# Patient Record
Sex: MALE | Race: WHITE | NOT HISPANIC OR LATINO | Employment: PART TIME | URBAN - METROPOLITAN AREA
[De-identification: names, ages, dates, MRNs, and addresses within clinical notes are randomized per-mention and may not be internally consistent; named-entity substitution may affect disease eponyms.]

---

## 2024-04-01 ENCOUNTER — OFFICE VISIT (OUTPATIENT)
Dept: FAMILY MEDICINE | Facility: OTHER | Age: 59
End: 2024-04-01
Payer: COMMERCIAL

## 2024-04-01 VITALS
DIASTOLIC BLOOD PRESSURE: 72 MMHG | TEMPERATURE: 97.6 F | OXYGEN SATURATION: 98 % | SYSTOLIC BLOOD PRESSURE: 126 MMHG | WEIGHT: 197 LBS | RESPIRATION RATE: 16 BRPM | HEART RATE: 71 BPM | HEIGHT: 66 IN | BODY MASS INDEX: 31.66 KG/M2

## 2024-04-01 DIAGNOSIS — Z80.42 FAMILY HISTORY OF PROSTATE CANCER: ICD-10-CM

## 2024-04-01 DIAGNOSIS — K42.9 UMBILICAL HERNIA WITHOUT OBSTRUCTION AND WITHOUT GANGRENE: ICD-10-CM

## 2024-04-01 DIAGNOSIS — E66.9 OBESITY (BMI 30-39.9): ICD-10-CM

## 2024-04-01 DIAGNOSIS — Z11.4 SCREENING FOR HIV (HUMAN IMMUNODEFICIENCY VIRUS): ICD-10-CM

## 2024-04-01 DIAGNOSIS — K21.00 GASTROESOPHAGEAL REFLUX DISEASE WITH ESOPHAGITIS WITHOUT HEMORRHAGE: ICD-10-CM

## 2024-04-01 DIAGNOSIS — Z00.00 ROUTINE GENERAL MEDICAL EXAMINATION AT A HEALTH CARE FACILITY: Primary | ICD-10-CM

## 2024-04-01 DIAGNOSIS — Z12.11 SCREEN FOR COLON CANCER: ICD-10-CM

## 2024-04-01 DIAGNOSIS — Z11.59 NEED FOR HEPATITIS C SCREENING TEST: ICD-10-CM

## 2024-04-01 DIAGNOSIS — Z12.5 SCREENING FOR PROSTATE CANCER: ICD-10-CM

## 2024-04-01 PROCEDURE — 99386 PREV VISIT NEW AGE 40-64: CPT | Performed by: STUDENT IN AN ORGANIZED HEALTH CARE EDUCATION/TRAINING PROGRAM

## 2024-04-01 PROCEDURE — 99213 OFFICE O/P EST LOW 20 MIN: CPT | Mod: 25 | Performed by: STUDENT IN AN ORGANIZED HEALTH CARE EDUCATION/TRAINING PROGRAM

## 2024-04-01 ASSESSMENT — PAIN SCALES - GENERAL: PAINLEVEL: NO PAIN (0)

## 2024-04-01 NOTE — PATIENT INSTRUCTIONS
Preventive Care Advice   This is general advice given by our system to help you stay healthy. However, your care team may have specific advice just for you. Please talk to your care team about your preventive care needs.  Nutrition  Eat 5 or more servings of fruits and vegetables each day.  Try wheat bread, brown rice and whole grain pasta (instead of white bread, rice, and pasta).  Get enough calcium and vitamin D. Check the label on foods and aim for 100% of the RDA (recommended daily allowance).  Lifestyle  Exercise at least 150 minutes each week   (30 minutes a day, 5 days a week).  Do muscle strengthening activities 2 days a week. These help control your weight and prevent disease.  No smoking.  Wear sunscreen to prevent skin cancer.  Have a dental exam and cleaning every 6 months.  Yearly exams  See your health care team every year to talk about:  Any changes in your health.  Any medicines your care team has prescribed.  Preventive care, family planning, and ways to prevent chronic diseases.  Shots (vaccines)   HPV shots (up to age 26), if you've never had them before.  Hepatitis B shots (up to age 59), if you've never had them before.  COVID-19 shot: Get this shot when it's due.  Flu shot: Get a flu shot every year.  Tetanus shot: Get a tetanus shot every 10 years.  Pneumococcal, hepatitis A, and RSV shots: Ask your care team if you need these based on your risk.  Shingles shot (for age 50 and up).  General health tests  Diabetes screening:  Starting at age 35, Get screened for diabetes at least every 3 years.  If you are younger than age 35, ask your care team if you should be screened for diabetes.  Cholesterol test: At age 39, start having a cholesterol test every 5 years, or more often if advised.  Bone density scan (DEXA): At age 50, ask your care team if you should have this scan for osteoporosis (brittle bones).  Hepatitis C: Get tested at least once in your life.  STIs (sexually transmitted  infections)  Before age 24: Ask your care team if you should be screened for STIs.  After age 24: Get screened for STIs if you're at risk. You are at risk for STIs (including HIV) if:  You are sexually active with more than one person.  You don't use condoms every time.  You or a partner was diagnosed with a sexually transmitted infection.  If you are at risk for HIV, ask about PrEP medicine to prevent HIV.  Get tested for HIV at least once in your life, whether you are at risk for HIV or not.  Cancer screening tests  Cervical cancer screening: If you have a cervix, begin getting regular cervical cancer screening tests at age 21. Most people who have regular screenings with normal results can stop after age 65. Talk about this with your provider.  Breast cancer scan (mammogram): If you've ever had breasts, begin having regular mammograms starting at age 40. This is a scan to check for breast cancer.  Colon cancer screening: It is important to start screening for colon cancer at age 45.  Have a colonoscopy test every 10 years (or more often if you're at risk) Or, ask your provider about stool tests like a FIT test every year or Cologuard test every 3 years.  To learn more about your testing options, visit: https://www.[employer]/297353.pdf.  For help making a decision, visit: https://bit.ly/yt79903.  Prostate cancer screening test: If you have a prostate and are age 55 to 69, ask your provider if you would benefit from a yearly prostate cancer screening test.  Lung cancer screening: If you are a current or former smoker age 50 to 80, ask your care team if ongoing lung cancer screenings are right for you.  For informational purposes only. Not to replace the advice of your health care provider. Copyright   2023 [employer]. All rights reserved. Clinically reviewed by the [facility]. [employer] 700041 - REV 01/24.            Lifestyle changes to improve acid reflux also known as  GERD (gastroesophageal reflux disease)    Review the following lifestyle changes that are known to help improve GERD symptoms. Select those you want to do to improve the quality of your life.  Eat smaller meals more often. Do not eat large meals or overeat, especially before exercising.  Stop eating two to four hours before going to bed.  Eat slowly and chew food well.  Stand or sit upright for at least 30 minutes after eating.  Raise the head of your bed 4 to 6 inches. To do this, put blocks or books under the legs at the head of your bed. Or place a wedge under the mattress. Do not sleep on several pillows as this can increase pressure on your stomach and make GERD worse.     -When you go to bed, start by lying on your left side to help make it less likely that you will have reflux.  -If you smoke, stop. Do not be around tobacco smoke.  -Lose weight if you are overweight. Excess weight around your waist causes more pressure on the stomach. Maintain a healthy weight.  -Limit how much alcohol you drink.  -Do not wear tight-fitting clothes.    Limit or stop eating foods that bring on your GERD symptoms or make your symptoms worse. Some foods that can cause or worsen GERD symptoms are listed below. You do not need to limit or stop all of the foods listed, only those that bring on or worsen your GERD symptoms.    Fatty foods, including cream sauces, butter, margarine, shortening  Fatty meat including high-fat hamburgers, vale, sausage and ribs  Chocolate, especially high-fat milk chocolate  Spearmint, peppermint  Tomatoes and tomato-based products (ketchup, pasta sauce, etc...)  Citrus fruit and juice  Caffeine  Carbonated drinks, especially soda pop with caffeine  Fried foods such as French fries and onion rings  High-fat dairy products including whole milk  Peanut butter and high-fat nuts  Hot sauces and peppers  Garlic  Onion  Apples  Cucumbers and pickles  Green peppers  Spicy food        2017 [facility] [employer] ([employer]). All rights reserved.  [id number]. Reviewed: 2023    Reviewed: 2023

## 2024-04-01 NOTE — PROGRESS NOTES
"  Assessment & Plan     Routine general medical examination at a health care facility  - Lipid panel reflex to direct LDL Fasting; Future  Screen for colon cancer  - Colonoscopy Screening  Referral; Future  Need for hepatitis C screening test  - Hepatitis C Screen Reflex to HCV RNA Quant and Genotype; Future  Health maintenance reviewed and updated.  Due for colonoscopy, previous tubular adenoma found in 2019 for 5-year follow-up.    Umbilical hernia without obstruction and without gangrene  Has been present for a few years, wishes to discuss things over with his surgeon to consider options.  Worrisome signs and symptoms discussed  - Adult General Surg Referral; Future    Obesity (BMI 30-39.9)  Adding to overall complexity, will A1c last year  - Glucose; Future    Screening for prostate cancer  - PSA, screen; Future  Family history of prostate cancer  Father with family history of prostate cancer.  Normal PSA last year.    Gastroesophageal reflux disease with esophagitis without hemorrhage  Continue with over-the-counter famotidine, also to consider dietary modification as well as lifestyle changes to improve symptoms.  Details outlined on the AVS      BMI  Estimated body mass index is 32.04 kg/m  as calculated from the following:    Height as of this encounter: 1.67 m (5' 5.75\").    Weight as of this encounter: 89.4 kg (197 lb).   Weight management plan: Discussed healthy diet and exercise guidelines          Jessica De Jesus is a 58 year old, presenting for the following health issues:  Hernia        4/1/2024     8:05 AM   Additional Questions   Roomed by kely   Accompanied by shonda         4/1/2024     8:05 AM   Patient Reported Additional Medications   Patient reports taking the following new medications none     History of Present Illness       Reason for visit:  Potential Hernia  Symptom onset:  More than a month  Symptoms include:  Bulge at belly button  Symptom intensity:  Mild  Symptom " "progression:  Staying the same  What makes it worse:  It doesn't hurt  What makes it better:  It doesn't hurt    He eats 0-1 servings of fruits and vegetables daily.He consumes 2 sweetened beverage(s) daily.He exercises with enough effort to increase his heart rate 9 or less minutes per day.  He exercises with enough effort to increase his heart rate 3 or less days per week.   He is taking medications regularly.    Notice it a few years ago wife wanted him to come in to get it checked out          Review of Systems  Constitutional, HEENT, cardiovascular, pulmonary, gi and gu systems are negative, except as otherwise noted.      Objective    /72   Pulse 71   Temp 97.6  F (36.4  C) (Temporal)   Resp 16   Ht 1.67 m (5' 5.75\")   Wt 89.4 kg (197 lb)   SpO2 98%   BMI 32.04 kg/m    Body mass index is 32.04 kg/m .  Physical Exam  Vitals and nursing note reviewed.   Constitutional:       General: He is not in acute distress.     Appearance: Normal appearance. He is not ill-appearing, toxic-appearing or diaphoretic.   HENT:      Head: Normocephalic and atraumatic.      Right Ear: Tympanic membrane, ear canal and external ear normal. There is no impacted cerumen.      Left Ear: Tympanic membrane, ear canal and external ear normal. There is no impacted cerumen.      Nose: Nose normal. No congestion or rhinorrhea.      Mouth/Throat:      Mouth: Mucous membranes are moist.      Pharynx: Oropharynx is clear. No oropharyngeal exudate or posterior oropharyngeal erythema.   Eyes:      General: No scleral icterus.        Right eye: No discharge.         Left eye: No discharge.      Extraocular Movements: Extraocular movements intact.      Conjunctiva/sclera: Conjunctivae normal.      Pupils: Pupils are equal, round, and reactive to light.   Cardiovascular:      Rate and Rhythm: Normal rate and regular rhythm.      Heart sounds: No murmur heard.  Pulmonary:      Effort: Pulmonary effort is normal. No respiratory distress. "      Breath sounds: Normal breath sounds. No stridor.   Abdominal:      General: There is no distension.      Palpations: Abdomen is soft.      Tenderness: There is no abdominal tenderness.      Hernia: No hernia is present.   Musculoskeletal:         General: Normal range of motion.      Cervical back: Normal range of motion.      Right lower leg: No edema.      Left lower leg: No edema.   Lymphadenopathy:      Cervical: No cervical adenopathy.   Skin:     General: Skin is warm.   Neurological:      Mental Status: He is alert.   Psychiatric:         Mood and Affect: Mood normal.         Behavior: Behavior normal.         Thought Content: Thought content normal.         Judgment: Judgment normal.                Signed Electronically by: NERI PERRIN MD

## 2024-04-08 ENCOUNTER — OFFICE VISIT (OUTPATIENT)
Dept: AUDIOLOGY | Facility: OTHER | Age: 59
End: 2024-04-08
Payer: COMMERCIAL

## 2024-04-08 DIAGNOSIS — H90.42 SENSORINEURAL HEARING LOSS (SNHL) OF LEFT EAR WITH UNRESTRICTED HEARING OF RIGHT EAR: Primary | ICD-10-CM

## 2024-04-08 PROCEDURE — 92550 TYMPANOMETRY & REFLEX THRESH: CPT | Performed by: AUDIOLOGIST

## 2024-04-08 PROCEDURE — 92557 COMPREHENSIVE HEARING TEST: CPT | Performed by: AUDIOLOGIST

## 2024-04-08 NOTE — PROGRESS NOTES
AUDIOLOGY REPORT    SUBJECTIVE:  Сергей Navarro is a 58 year old male who was seen in the Audiology Clinic at the [facility] for audiologic evaluation, referred by self. The patient has not noted major changes in hearing. 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 The patient reports a family history of hearing loss with age. He reports that he does not have tinnitus, dizziness, ear pressure, ear pain, or a history of ear problems or ear surgery. The patient was unaccompanied to today's appointment.    OBJECTIVE:  Otoscopic exam indicates ears are clear of cerumen bilaterally     Pure Tone Thresholds assessed using conventional audiometry with good  reliability from 250-8000 Hz bilaterally using insert earphones and circumaural headphones     RIGHT:  normal hearing sensitivity at all tested frequencies     LEFT:    normal hearing sensitivity through 4000 Hz sloping to  mild to moderate likely  sensorineural hearing loss    Tympanogram:    RIGHT: normal eardrum mobility    LEFT:   normal eardrum mobility    Reflexes (reported by stimulus ear):  RIGHT: Ipsilateral is present at normal levels  RIGHT: Contralateral is present at elevated levels   LEFT:   Ipsilateral is present at normal levels  LEFT:   Contralateral is present at elevated levels     Speech Reception Threshold:    RIGHT: 10 dB HL    LEFT:   10 dB HL    Word Recognition Score:     RIGHT: 100% at 55 dB HL using NU-6 recorded word list.    LEFT:   100% at 55 dB HL using NU-6 recorded word list.      ASSESSMENT:     ICD-10-CM    1. Sensorineural hearing loss (SNHL) of left ear with unrestricted hearing of right ear  H90.42 [facility] Recog ([id number])     Tymps / Reflex   ([id number])          Today s results were discussed with the patient in detail.     PLAN:  Patient was counseled regarding  hearing loss and impact on communication. Patient is note a candidate for amplification at this time. It is recommended that the patient return in a year to recheck hearing and assess the stability of the high frequency asymmetry. The patient was counseled that he could also follow up with ENT.  Please call this clinic with questions regarding these results or recommendations.      Codi Gomez, CCC-A  [place] Licensed Audiologist #[id number]  4/8/2024

## 2024-04-16 ENCOUNTER — TELEPHONE (OUTPATIENT)
Dept: GASTROENTEROLOGY | Facility: CLINIC | Age: 59
End: 2024-04-16
Payer: COMMERCIAL

## 2024-04-16 NOTE — TELEPHONE ENCOUNTER
"Endoscopy Scheduling Screen    Have you had a positive Covid test in the last 14 days?  No    What is your communication preference for Instructions and/or Bowel Prep?   MyChart    What insurance is in the chart?  Other:  [employer]    Ordering/Referring Provider:     NERI PERRIN      (If ordering provider performs procedure, schedule with ordering provider unless otherwise instructed. )    BMI: Estimated body mass index is 32.04 kg/m  as calculated from the following:    Height as of 4/1/24: 1.67 m (5' 5.75\").    Weight as of 4/1/24: 89.4 kg (197 lb).     Sedation Ordered  moderate sedation.   If patient BMI > 50 do not schedule in [facility].    If patient BMI > 45 do not schedule at [facility].    Are you taking methadone or Suboxone?  No    Have you had difficulties, pain, or discomfort during past endoscopy procedures?  No    Are you taking any prescription medications for pain 3 or more times per week?   NO, No RN review required.    Do you have a history of malignant hyperthermia?  No    (Females) Are you currently pregnant?   No     Have you been diagnosed or told you have pulmonary hypertension?   No    Do you have an LVAD?  No    Have you been told you have moderate to severe sleep apnea?  No    Have you been told you have COPD, asthma, or any other lung disease?  No    Do you have any heart conditions?  No     Have you ever had or are you waiting for an organ transplant?  No. Continue scheduling, no site restrictions.    Have you had a stroke or transient ischemic attack (TIA aka \"mini stroke\" in the last 6 months?   No    Have you been diagnosed with or been told you have cirrhosis of the liver?   No    Are you currently on dialysis?   No    Do you need assistance transferring?   No    BMI: Estimated body mass index is 32.04 kg/m  as calculated from the following:    Height as of 4/1/24: 1.67 m (5' 5.75\").    Weight as of 4/1/24: 89.4 kg (197 lb).     Is patients BMI > 40 and scheduling location [facility]?  No    Do " you take an injectable medication for weight loss or diabetes (excluding insulin)?  No    Do you take the medication Naltrexone?  No    Do you take blood thinners?  No       Prep   Are you currently on dialysis or do you have chronic kidney disease?  No    Do you have a diagnosis of diabetes?  No    Do you have a diagnosis of cystic fibrosis (CF)?  No    On a regular basis do you go 3 -5 days between bowel movements?  No    BMI > 40?  No    Preferred Pharmacy:    [facility] [address] [facility] [employer] - [place], [place] - [address] [address] [place] [address]  [id number] [address] [facility] [address]  Phone: [phone] Fax: [phone]      Final Scheduling Details     Procedure scheduled  Colonoscopy    Surgeon:  HEATHER     Date of procedure:  06/25/2024     Pre-OP / PAC:   No - Not required for this site.    Location  [facility] - [facility] - Patient preference.    Sedation   Moderate Sedation - Per order.      Patient Reminders:   You will receive a call from a Nurse to review instructions and health history.  This assessment must be completed prior to your procedure.  Failure to complete the Nurse assessment may result in the procedure being cancelled.      On the day of your procedure, please designate an adult(s) who can drive you home stay with you for the next 24 hours. The medicines used in the exam will make you sleepy. You will not be able to drive.      You cannot take public transportation, ride share services, or non-medical taxi service without a responsible caregiver.  Medical transport services are allowed with the requirement that a responsible caregiver will receive you at your destination.  We require that drivers and caregivers are confirmed prior to your procedure.

## 2024-06-12 ENCOUNTER — OFFICE VISIT (OUTPATIENT)
Dept: SURGERY | Facility: CLINIC | Age: 59
End: 2024-06-12
Attending: STUDENT IN AN ORGANIZED HEALTH CARE EDUCATION/TRAINING PROGRAM
Payer: COMMERCIAL

## 2024-06-12 VITALS
BODY MASS INDEX: 32.04 KG/M2 | HEART RATE: 87 BPM | DIASTOLIC BLOOD PRESSURE: 82 MMHG | SYSTOLIC BLOOD PRESSURE: 151 MMHG | WEIGHT: 197 LBS

## 2024-06-12 DIAGNOSIS — K42.9 UMBILICAL HERNIA WITHOUT OBSTRUCTION AND WITHOUT GANGRENE: ICD-10-CM

## 2024-06-12 PROCEDURE — 99203 OFFICE O/P NEW LOW 30 MIN: CPT | Performed by: SURGERY

## 2024-06-12 NOTE — PROGRESS NOTES
Patient seen in consultation for umbilical hernia by Michael Kwan    HPI:  Patient is a 58 year old male  with complaints of small umbilical hernia  The patient noticed the symptoms about over 5 years ago. Had talked with surgeon in 2019 about repair and decided to leave alone at that time. Has not changed since then. Still small. Not causing pain.  nothing makes the episode better.  Patient has family history of hernia problems in father he thinks, long ago    Review Of Systems    Skin: negative  Ears/Nose/Throat: negative  Respiratory: No shortness of breath, dyspnea on exertion, cough, or hemoptysis  Cardiovascular: negative  Gastrointestinal: negative  Genitourinary: negative  Musculoskeletal: negative  Neurologic: negative  Hematologic/Lymphatic/Immunologic: negative  Endocrine: negative      No past medical history on file.  Patient Active Problem List   Diagnosis    Family history of prostate cancer    Obesity (BMI 30-39.9)    Umbilical hernia without obstruction and without gangrene    Gastroesophageal reflux disease with esophagitis without hemorrhage       No past surgical history on file.  1986 spinal fusion    Social History     Socioeconomic History    Marital status:      Spouse name: Not on file    Number of children: Not on file    Years of education: Not on file    Highest education level: Not on file   Occupational History    Not on file   Tobacco Use    Smoking status: Former     Types: Cigarettes    Smokeless tobacco: Never    Tobacco comments:     40 or more years ago   Vaping Use    Vaping status: Never Used   Substance and Sexual Activity    Alcohol use: Yes    Drug use: Never    Sexual activity: Yes     Partners: Female     Birth control/protection: None   Other Topics Concern    Parent/sibling w/ CABG, MI or angioplasty before 65F 55M? No   Social History Narrative    Not on file     Social Determinants of Health     Financial Resource Strain: Not At Risk (5/10/2023)     [facility]    Financial Resource Strain     Is it hard for you to pay for the very basics like food, housing, medical care or heating?: No   Food Insecurity: Not At Risk (5/10/2023)    [facility]    Food Insecurity     Does your food run out before you have the money to buy more?: No   Transportation Needs: Not At Risk (5/10/2023)    [facility]    Transportation Needs     Does a lack of transportation keep you from your medical appointments or from getting your medications?: No   Physical Activity: Not on file   Stress: Not on file   Social Connections: Not on file   Interpersonal Safety: Low Risk  (4/1/2024)    Interpersonal Safety     Do you feel physically and emotionally safe where you currently live?: Yes     Within the past 12 months, have you been hit, slapped, kicked or otherwise physically hurt by someone?: No     Within the past 12 months, have you been humiliated or emotionally abused in other ways by your partner or ex-partner?: No   Housing Stability: Not on file       No current outpatient medications on file.   OTC pepcid    Medications and history reviewed    Physical exam:  Vitals: BP (!) 151/82   Pulse 87   Wt 89.4 kg (197 lb)   BMI 32.04 kg/m    BMI= Body mass index is 32.04 kg/m .    Constitutional: healthy, alert, and no distress  Head: Normocephalic. No masses, lesions, tenderness or abnormalities  Cardiovascular: negative, PMI normal. No lifts, heaves, or thrills. RRR. No murmurs, clicks gallops or rub  Respiratory: negative, Percussion normal. Good diaphragmatic excursion. Lungs clear  Gastrointestinal: Abdomen soft, non-tender. BS normal. No masses, organomegaly, positive findings: umbilical hernia reducible with some tenderness, defect 1.5 cm. Containing omentum.  : Deferred  Musculoskeletal: extremities normal- no gross deformities noted, gait normal, and normal muscle tone  Skin: no  suspicious lesions or rashes  Psychiatric: mentation appears normal and affect normal/bright      Assessment:     ICD-10-CM    1. Umbilical hernia without obstruction and without gangrene  K42.9 Adult General Surg Referral        Plan: For this small umbilical hernia I offered a open repair.  Likely would be able to do a primary repair based on size of defect on clinical exam but discussed that if ends up being larger than 1.5 cm then would recommend mesh reinforcement to help keep recurrence rate low.  He is thinking that he would like to go ahead and get it fixed but he have to think a bit more about it before deciding for sure.  Card provided so he can give us a call when he has decided and we can place orders if needed.  Risks of surgery discussed including, but not limited to bleeding, infection, recurrence, damage to intra-abdominal contents such as nerves, blood vessels, bowel or other organs.  Risks of anesthesia also discussed.  Although mesh is a better long term repair if it gets infected it must be removed. Mesh problems such as fracture, erosion or adhesions are possible.  If there is evidence of an infection at time of surgery it will be cancelled and rescheduled to when well.    Discussed massaging hernia back in and using ice if becomes more painful.  If not able to reduce then go to emergency room.    David Steel MD

## 2024-06-12 NOTE — LETTER
6/12/2024      Сергей Navarro  [address]  [place] [address]      Dear Colleague,    Thank you for referring your patient, Сергей Navarro, to the [facility]. Please see a copy of my visit note below.    Patient seen in consultation for umbilical hernia by Michael Kwan    HPI:  Patient is a 58 year old male  with complaints of small umbilical hernia  The patient noticed the symptoms about over 5 years ago. Had talked with surgeon in 2019 about repair and decided to leave alone at that time. Has not changed since then. Still small. Not causing pain.  nothing makes the episode better.  Patient has family history of hernia problems in father he thinks, long ago    Review Of Systems    Skin: negative  Ears/Nose/Throat: negative  Respiratory: No shortness of breath, dyspnea on exertion, cough, or hemoptysis  Cardiovascular: negative  Gastrointestinal: negative  Genitourinary: negative  Musculoskeletal: negative  Neurologic: negative  Hematologic/Lymphatic/Immunologic: negative  Endocrine: negative      No past medical history on file.  Patient Active Problem List   Diagnosis     Family history of prostate cancer     Obesity (BMI 30-39.9)     Umbilical hernia without obstruction and without gangrene     Gastroesophageal reflux disease with esophagitis without hemorrhage       No past surgical history on file.  1986 spinal fusion    Social History     Socioeconomic History     Marital status:      Spouse name: Not on file     Number of children: Not on file     Years of education: Not on file     Highest education level: Not on file   Occupational History     Not on file   Tobacco Use     Smoking status: Former     Types: Cigarettes     Smokeless tobacco: Never     Tobacco comments:     40 or more years ago   Vaping Use     Vaping status: Never Used   Substance and Sexual Activity     Alcohol use: Yes     Drug use: Never     Sexual activity: Yes     Partners: Female      Birth control/protection: None   Other Topics Concern     Parent/sibling w/ CABG, MI or angioplasty before 65F 55M? No   Social History Narrative     Not on file     Social Determinants of Health     Financial Resource Strain: Not At Risk (5/10/2023)    Received from [facility]    Financial Resource Strain      Is it hard for you to pay for the very basics like food, housing, medical care or heating?: No   Food Insecurity: Not At Risk (5/10/2023)    Received from [facility]    Food Insecurity      Does your food run out before you have the money to buy more?: No   Transportation Needs: Not At Risk (5/10/2023)    Received from [facility]    Transportation Needs      Does a lack of transportation keep you from your medical appointments or from getting your medications?: No   Physical Activity: Not on file   Stress: Not on file   Social Connections: Not on file   Interpersonal Safety: Low Risk  (4/1/2024)    Interpersonal Safety      Do you feel physically and emotionally safe where you currently live?: Yes      Within the past 12 months, have you been hit, slapped, kicked or otherwise physically hurt by someone?: No      Within the past 12 months, have you been humiliated or emotionally abused in other ways by your partner or ex-partner?: No   Housing Stability: Not on file       No current outpatient medications on file.   OTC pepcid    Medications and history reviewed    Physical exam:  Vitals: BP (!) 151/82   Pulse 87   Wt 89.4 kg (197 lb)   BMI 32.04 kg/m    BMI= Body mass index is 32.04 kg/m .    Constitutional: healthy, alert, and no distress  Head: Normocephalic. No masses, lesions, tenderness or abnormalities  Cardiovascular: negative, PMI normal. No lifts, heaves, or thrills. RRR. No murmurs, clicks gallops or rub  Respiratory: negative, Percussion normal. Good diaphragmatic excursion. Lungs clear  Gastrointestinal: Abdomen soft, non-tender. BS  normal. No masses, organomegaly, positive findings: umbilical hernia reducible with some tenderness, defect 1.5 cm. Containing omentum.  : Deferred  Musculoskeletal: extremities normal- no gross deformities noted, gait normal, and normal muscle tone  Skin: no suspicious lesions or rashes  Psychiatric: mentation appears normal and affect normal/bright      Assessment:     ICD-10-CM    1. Umbilical hernia without obstruction and without gangrene  K42.9 Adult General Surg Referral        Plan: For this small umbilical hernia I offered a open repair.  Likely would be able to do a primary repair based on size of defect on clinical exam but discussed that if ends up being larger than 1.5 cm then would recommend mesh reinforcement to help keep recurrence rate low.  He is thinking that he would like to go ahead and get it fixed but he have to think a bit more about it before deciding for sure.  Card provided so he can give us a call when he has decided and we can place orders if needed.  Risks of surgery discussed including, but not limited to bleeding, infection, recurrence, damage to intra-abdominal contents such as nerves, blood vessels, bowel or other organs.  Risks of anesthesia also discussed.  Although mesh is a better long term repair if it gets infected it must be removed. Mesh problems such as fracture, erosion or adhesions are possible.  If there is evidence of an infection at time of surgery it will be cancelled and rescheduled to when well.    Discussed massaging hernia back in and using ice if becomes more painful.  If not able to reduce then go to emergency room.    David Steel MD      Again, thank you for allowing me to participate in the care of your patient.        Sincerely,        David Steel MD

## 2024-06-25 ENCOUNTER — HOSPITAL ENCOUNTER (OUTPATIENT)
Facility: AMBULATORY SURGERY CENTER | Age: 59
Discharge: HOME | End: 2024-06-25
Attending: SURGERY | Admitting: SURGERY
Payer: COMMERCIAL

## 2024-06-25 ENCOUNTER — TELEPHONE (OUTPATIENT)
Dept: SURGERY | Facility: CLINIC | Age: 59
End: 2024-06-25

## 2024-06-25 VITALS
TEMPERATURE: 97.1 F | OXYGEN SATURATION: 95 % | SYSTOLIC BLOOD PRESSURE: 107 MMHG | RESPIRATION RATE: 18 BRPM | DIASTOLIC BLOOD PRESSURE: 81 MMHG | HEART RATE: 72 BPM

## 2024-06-25 DIAGNOSIS — K42.9 UMBILICAL HERNIA WITHOUT OBSTRUCTION AND WITHOUT GANGRENE: Primary | ICD-10-CM

## 2024-06-25 LAB — COLONOSCOPY: NORMAL

## 2024-06-25 PROCEDURE — G8907 PT DOC NO EVENTS ON DISCHARG: HCPCS

## 2024-06-25 PROCEDURE — G8918 PT W/O PREOP ORDER IV AB PRO: HCPCS

## 2024-06-25 PROCEDURE — 45378 DIAGNOSTIC COLONOSCOPY: CPT

## 2024-06-25 RX ORDER — ONDANSETRON 2 MG/ML
4 INJECTION INTRAMUSCULAR; INTRAVENOUS EVERY 6 HOURS PRN
Status: DISCONTINUED | OUTPATIENT
Start: 2024-06-25 | End: 2024-06-26 | Stop reason: HOSPADM

## 2024-06-25 RX ORDER — PROCHLORPERAZINE MALEATE 10 MG
10 TABLET ORAL EVERY 6 HOURS PRN
Status: DISCONTINUED | OUTPATIENT
Start: 2024-06-25 | End: 2024-06-26 | Stop reason: HOSPADM

## 2024-06-25 RX ORDER — LIDOCAINE 40 MG/G
CREAM TOPICAL
Status: DISCONTINUED | OUTPATIENT
Start: 2024-06-25 | End: 2024-06-26 | Stop reason: HOSPADM

## 2024-06-25 RX ORDER — NALOXONE HYDROCHLORIDE 0.4 MG/ML
0.4 INJECTION, SOLUTION INTRAMUSCULAR; INTRAVENOUS; SUBCUTANEOUS
Status: DISCONTINUED | OUTPATIENT
Start: 2024-06-25 | End: 2024-06-26 | Stop reason: HOSPADM

## 2024-06-25 RX ORDER — ONDANSETRON 2 MG/ML
4 INJECTION INTRAMUSCULAR; INTRAVENOUS
Status: DISCONTINUED | OUTPATIENT
Start: 2024-06-25 | End: 2024-06-26 | Stop reason: HOSPADM

## 2024-06-25 RX ORDER — NALOXONE HYDROCHLORIDE 0.4 MG/ML
0.2 INJECTION, SOLUTION INTRAMUSCULAR; INTRAVENOUS; SUBCUTANEOUS
Status: DISCONTINUED | OUTPATIENT
Start: 2024-06-25 | End: 2024-06-26 | Stop reason: HOSPADM

## 2024-06-25 RX ORDER — SODIUM CHLORIDE, SODIUM LACTATE, POTASSIUM CHLORIDE, CALCIUM CHLORIDE 600; 310; 30; 20 MG/100ML; MG/100ML; MG/100ML; MG/100ML
INJECTION, SOLUTION INTRAVENOUS CONTINUOUS PRN
Status: COMPLETED | OUTPATIENT
Start: 2024-06-25 | End: 2024-06-25

## 2024-06-25 RX ORDER — FLUMAZENIL 0.1 MG/ML
0.2 INJECTION, SOLUTION INTRAVENOUS
Status: ACTIVE | OUTPATIENT
Start: 2024-06-25 | End: 2024-06-25

## 2024-06-25 RX ORDER — ONDANSETRON 4 MG/1
4 TABLET, ORALLY DISINTEGRATING ORAL EVERY 6 HOURS PRN
Status: DISCONTINUED | OUTPATIENT
Start: 2024-06-25 | End: 2024-06-26 | Stop reason: HOSPADM

## 2024-06-25 RX ORDER — FENTANYL CITRATE 50 UG/ML
INJECTION, SOLUTION INTRAMUSCULAR; INTRAVENOUS PRN
Status: DISCONTINUED | OUTPATIENT
Start: 2024-06-25 | End: 2024-06-25 | Stop reason: HOSPADM

## 2024-06-25 NOTE — H&P
Pre-Endoscopy History and Physical     [id number]   YOB: 1965 Age: 58 year old     Date of Procedure: 6/25/2024  Primary care provider: Michael Kwan  Type of Endoscopy: colonoscopy  Reason for Procedure: history of polyp  Type of Anesthesia Anticipated: Moderate Sedation    HPI:    Сергей is a 58 year old male who will be undergoing the above procedure.    Single polyp 5 years ago, path not available  Father with anal cancer    A history and physical has been performed. The patient's medications and allergies have been reviewed. The risks and benefits of the procedure and the sedation options and risks were discussed with the patient.  All questions were answered and informed consent was obtained.      He denies a personal or family history of anesthesia complications or bleeding disorders.     No Known Allergies     Cannot display prior to admission medications because the patient has not been admitted in this contact.       Patient Active Problem List   Diagnosis    Family history of prostate cancer    Obesity (BMI 30-39.9)    Umbilical hernia without obstruction and without gangrene    Gastroesophageal reflux disease with esophagitis without hemorrhage        History reviewed. No pertinent past medical history.     Past Surgical History:   Procedure Laterality Date    COLONOSCOPY  06/25/2024       Social History     Tobacco Use    Smoking status: Former     Types: Cigarettes    Smokeless tobacco: Never    Tobacco comments:     40 or more years ago   Substance Use Topics    Alcohol use: Yes       Family History   Problem Relation Age of Onset    Prostate Cancer Father     Other Cancer Father         Anal then developed kidney and bladder cancer       REVIEW OF SYSTEMS:     5 point ROS negative except as noted above in HPI, including Gen., Resp., CV, GI &  system review.      PHYSICAL EXAM:   /85   Pulse 91   Temp 97.1  F (36.2  C) (Temporal)   Resp 16    "SpO2 93%  Estimated body mass index is 32.04 kg/m  as calculated from the following:    Height as of 4/1/24: 1.67 m (5' 5.75\").    Weight as of 6/12/24: 89.4 kg (197 lb).   GENERAL APPEARANCE: healthy, alert, and no distress  MENTAL STATUS: alert  AIRWAY EXAM: Mallampatti Class II (visualization of the soft palate, fauces, and uvula)  RESP: lungs clear to auscultation - no rales, rhonchi or wheezes  CV: regular rates and rhythm      DIAGNOSTICS:    Not indicated      IMPRESSION   ASA Class 2 - Mild systemic disease        PLAN:       Plan for colonoscopy. We discussed the risks, benefits and alternatives and the patient wished to proceed.    The above has been forwarded to the consulting provider.      Signed Electronically by: David Steel MD  June 25, 2024    "

## 2024-07-31 NOTE — TELEPHONE ENCOUNTER
Type of surgery: HERNIORRHAPHY, UMBILICAL, OPEN possible mesh   Location of surgery: [facility]  Date and time of surgery: 10/16/2024  Surgeon: HEATHER  Pre-Op Appt Date: 10/08/2024  Post-Op Appt Date: 10/30/2024   Packet sent out: Yes  Pre-cert/Authorization completed:  No  Date:

## 2024-09-12 NOTE — TELEPHONE ENCOUNTER
Patient has canceled for 10/16/2024 noting a conflict, Patient has rescheduled to 12/06/2024 in [facility]  Preop 11/26/2024  Postop 12/18/2024

## 2024-11-26 ENCOUNTER — OFFICE VISIT (OUTPATIENT)
Dept: FAMILY MEDICINE | Facility: OTHER | Age: 59
End: 2024-11-26
Payer: COMMERCIAL

## 2024-11-26 VITALS
RESPIRATION RATE: 15 BRPM | HEART RATE: 72 BPM | TEMPERATURE: 98.3 F | WEIGHT: 189.5 LBS | BODY MASS INDEX: 30.46 KG/M2 | HEIGHT: 66 IN | DIASTOLIC BLOOD PRESSURE: 78 MMHG | SYSTOLIC BLOOD PRESSURE: 126 MMHG | OXYGEN SATURATION: 96 %

## 2024-11-26 DIAGNOSIS — Z01.818 PREOP GENERAL PHYSICAL EXAM: Primary | ICD-10-CM

## 2024-11-26 DIAGNOSIS — E66.9 OBESITY (BMI 30-39.9): ICD-10-CM

## 2024-11-26 DIAGNOSIS — Z12.5 SCREENING FOR PROSTATE CANCER: ICD-10-CM

## 2024-11-26 DIAGNOSIS — K42.9 UMBILICAL HERNIA WITHOUT OBSTRUCTION AND WITHOUT GANGRENE: ICD-10-CM

## 2024-11-26 DIAGNOSIS — Z13.220 LIPID SCREENING: ICD-10-CM

## 2024-11-26 LAB
CHOLEST SERPL-MCNC: 163 MG/DL
FASTING STATUS PATIENT QL REPORTED: YES
FASTING STATUS PATIENT QL REPORTED: YES
GLUCOSE SERPL-MCNC: 91 MG/DL (ref 70–99)
HDLC SERPL-MCNC: 67 MG/DL
HGB BLD-MCNC: 14.6 G/DL (ref 13.3–17.7)
LDLC SERPL CALC-MCNC: 87 MG/DL
NONHDLC SERPL-MCNC: 96 MG/DL
PSA SERPL DL<=0.01 NG/ML-MCNC: 1.74 NG/ML (ref 0–3.5)
TRIGL SERPL-MCNC: 46 MG/DL

## 2024-11-26 PROCEDURE — 82947 ASSAY GLUCOSE BLOOD QUANT: CPT | Performed by: STUDENT IN AN ORGANIZED HEALTH CARE EDUCATION/TRAINING PROGRAM

## 2024-11-26 PROCEDURE — 99214 OFFICE O/P EST MOD 30 MIN: CPT | Performed by: STUDENT IN AN ORGANIZED HEALTH CARE EDUCATION/TRAINING PROGRAM

## 2024-11-26 PROCEDURE — 36415 COLL VENOUS BLD VENIPUNCTURE: CPT | Performed by: STUDENT IN AN ORGANIZED HEALTH CARE EDUCATION/TRAINING PROGRAM

## 2024-11-26 PROCEDURE — 85018 HEMOGLOBIN: CPT | Performed by: STUDENT IN AN ORGANIZED HEALTH CARE EDUCATION/TRAINING PROGRAM

## 2024-11-26 PROCEDURE — 80061 LIPID PANEL: CPT | Performed by: STUDENT IN AN ORGANIZED HEALTH CARE EDUCATION/TRAINING PROGRAM

## 2024-11-26 PROCEDURE — G0103 PSA SCREENING: HCPCS | Performed by: STUDENT IN AN ORGANIZED HEALTH CARE EDUCATION/TRAINING PROGRAM

## 2024-11-26 ASSESSMENT — PAIN SCALES - GENERAL: PAINLEVEL_OUTOF10: NO PAIN (0)

## 2024-11-26 NOTE — PROGRESS NOTES
Preoperative Evaluation  [facility]  [address]  [facility] [address]  Phone: [phone]  Fax: [phone]  Primary Provider: NERI PERRIN MD  Pre-op Performing Provider: NERI PERRIN MD  Nov 26, 2024 11/26/2024   Surgical Information   What procedure is being done? hernia    Facility or Hospital where procedure/surgery will be performed: [facility]    Who is doing the procedure / surgery? mitul    Date of surgery / procedure: 12/6/2024    Time of surgery / procedure: 8    Where do you plan to recover after surgery? at home with family        Patient-reported     Fax number for surgical facility: Note does not need to be faxed, will be available electronically in Epic.    Assessment & Plan     The proposed surgical procedure is considered INTERMEDIATE     Preop general physical exam  - Glucose  - Hemoglobin  Umbilical hernia without obstruction and without gangrene  Optimized for planned procedure as above    Screening for prostate cancer  Overdue for screening  - PSA, screen    Lipid screening  Overdue for screening  - Lipid panel reflex to direct LDL Fasting    GERD  Utilizes over-the-counter famotidine as needed    Obesity BMI 31.01  Healthy lifestyle measures discussed including diet and exercise options. Also, counseled that weight loss would help management and/or prevention of diseases such as diabetes, hypertension, hyperlipidemia, etc...           - No identified additional risk factors other than previously addressed    Antiplatelet or Anticoagulation Medication Instructions   - Patient is on no antiplatelet or anticoagulation medications.    Additional Medication Instructions   - Herbal medications and vitamins: DO NOT TAKE 14 days prior to surgery.    Recommendation  Approval given to proceed with proposed procedure, without further diagnostic evaluation.    Jessica De Jesus is a 59 year old, presenting for the  following:  Pre-Op Exam          11/26/2024     8:03 AM   Additional Questions   Roomed by nancy   Accompanied by self     HPI related to upcoming procedure: Chronic umbilical hernia with some occasional discomfort, no concerns for incarceration or strangulation in the past.  Past medical history including intermittent GERD and obesity.        11/26/2024   Pre-Op Questionnaire   Have you ever had a heart attack or stroke? No    Have you ever had surgery on your heart or blood vessels, such as a stent placement, a coronary artery bypass, or surgery on an artery in your head, neck, heart, or legs? No    Do you have chest pain with activity? No    Do you have a history of heart failure? No    Do you currently have a cold, bronchitis or symptoms of other infection? No    Do you have a cough, shortness of breath, or wheezing? No    Do you or anyone in your family have previous history of blood clots? No    Do you or does anyone in your family have a serious bleeding problem such as prolonged bleeding following surgeries or cuts? No    Have you ever had problems with anemia or been told to take iron pills? No    Have you had any abnormal blood loss such as black, tarry or bloody stools? No    Have you ever had a blood transfusion? No     No    Are you willing to have a blood transfusion if it is medically needed before, during, or after your surgery? Yes     Yes    Have you or any of your relatives ever had problems with anesthesia? No     No    Do you have sleep apnea, excessive snoring or daytime drowsiness? No     No    Do you have any artifical heart valves or other implanted medical devices like a pacemaker, defibrillator, or continuous glucose monitor? No     No    Do you have artificial joints? No     No    Are you allergic to latex? No     No    Is there any chance that you may be pregnant? No        Patient-reported    Multiple values from one day are sorted in reverse-chronological order     [facility] Care  "Directive  Patient does not have a Health Care Directive: Discussed advance care planning with patient; information given to patient to review.    Preoperative Review of    reviewed - no record of controlled substances prescribed.          Patient Active Problem List    Diagnosis Date Noted    Family history of prostate cancer 04/01/2024     Priority: Medium    Obesity (BMI 30-39.9) 04/01/2024     Priority: Medium    Umbilical hernia without obstruction and without gangrene 04/01/2024     Priority: Medium    Gastroesophageal reflux disease with esophagitis without hemorrhage 04/01/2024     Priority: Medium      No past medical history on file.  Past Surgical History:   Procedure Laterality Date    COLONOSCOPY  06/25/2024    COLONOSCOPY WITH CO2 INSUFFLATION N/A 6/25/2024    Procedure: Colonoscopy with CO2 insufflation;  Surgeon: David Steel MD;  Location: [facility]     No current outpatient medications on file.       No Known Allergies     Social History     Tobacco Use    Smoking status: Former     Types: Cigarettes     Passive exposure: Never    Smokeless tobacco: Never    Tobacco comments:     40 or more years ago   Substance Use Topics    Alcohol use: Yes                 Review of Systems  Constitutional, HEENT, cardiovascular, pulmonary, gi and gu systems are negative, except as otherwise noted.    Objective    There were no vitals taken for this visit.   Estimated body mass index is 32.04 kg/m  as calculated from the following:    Height as of 4/1/24: 1.67 m (5' 5.75\").    Weight as of 6/12/24: 89.4 kg (197 lb).  Physical Exam  GENERAL: alert and no distress  EYES: Eyes grossly normal to inspection, PERRL and conjunctivae and sclerae normal  HENT: ear canals and TM's normal, nose and mouth without ulcers or lesions  NECK: no adenopathy, no asymmetry, masses, or scars  RESP: lungs clear to auscultation - no rales, rhonchi or wheezes  CV: regular rate and rhythm, normal S1 S2, no S3 or S4, no " "murmur, click or rub, no peripheral edema  ABDOMEN: soft, nontender, no hepatosplenomegaly, umbilical hernia  MS: no gross musculoskeletal defects noted, no edema  SKIN: no suspicious lesions or rashes  NEURO: Normal strength and tone, mentation intact and speech normal  PSYCH: mentation appears normal, affect normal/bright    No results for input(s): \"HGB\", \"PLT\", \"INR\", \"NA\", \"POTASSIUM\", \"CR\", \"A1C\" in the last 8760 hours.     Diagnostics  Labs pending at this time.  Results will be reviewed when available.   No EKG required, no history of coronary heart disease, significant arrhythmia, peripheral arterial disease or other structural heart disease.    Revised Cardiac Risk Index (RCRI)  The patient has the following serious cardiovascular risks for perioperative complications:   - No serious cardiac risks = 0 points     RCRI Interpretation: 0 points: Class I (very low risk - 0.4% complication rate)         Signed Electronically by: NERI PERRIN MD  A copy of this evaluation report is provided to the requesting physician.        "

## 2024-11-26 NOTE — RESULT ENCOUNTER NOTE
Сергей,    Your results are within normal limits/negative and nothing else needs to be done at this time.       If you have any questions feel free to call the clinic at [phone].      Thank you,    Michael Kwan MD

## 2024-11-26 NOTE — PATIENT INSTRUCTIONS
How to Take Your Medication Before Surgery  Preoperative Medication Instructions          Patient Education   Preparing for Your Surgery  For Adults  Getting started  In most cases, a nurse will call to review your health history and instructions. They will give you an arrival time based on your scheduled surgery time. Please be ready to share:  Your doctor's clinic name and phone number  Your medical, surgical, and anesthesia history  A list of allergies and sensitivities  A list of medicines, including herbal treatments and over-the-counter drugs  Whether the patient has a legal guardian (ask how to send us the papers in advance)  Note: You may not receive a call if you were seen at our PAC (Preoperative Assessment Center).  Please tell us if you're pregnant--or if there's any chance you might be pregnant. Some surgeries may injure a fetus (unborn baby), so they require a pregnancy test. Surgeries that are safe for a fetus don't always need a test, and you can choose whether to have one.   Preparing for surgery  Within 10 to 30 days of surgery: Have a pre-op exam (sometimes called an H&P, or History and Physical). This can be done at a clinic or pre-operative center.  If you're having a , you may not need this exam. Talk to your care team.  At your pre-op exam, talk to your care team about all medicines you take. (This includes CBD oil and any drugs, such as THC, marijuana, and other forms of cannabis.) If you need to stop any medicine before surgery, ask when to start taking it again.  This is for your safety. Many medicines and drugs can make you bleed too much during surgery. Some change how well surgery (anesthesia) drugs work.  Call your insurance company to let them know you're having surgery. (If you don't have insurance, call [phone].)  Call your clinic if there's any change in your health. This includes a scrape or scratch near the surgery site, or any signs of a cold (sore throat, runny  nose, cough, rash, fever).  Eating and drinking guidelines  For your safety: Unless your surgeon tells you otherwise, follow the guidelines below.  Eat and drink as normal until 8 hours before you arrive for surgery. After that, no food or milk. You can spit out gum when you arrive.  Drink clear liquids until 2 hours before you arrive. These are liquids you can see through, like water, Gatorade, and Propel Water. They also include plain black coffee and tea (no cream or milk).  No alcohol for 24 hours before you arrive. The night before surgery, stop any drinks that contain THC.  If your care team tells you to take medicine on the morning of surgery, it's okay to take it with a sip of water. No other medicines or drugs are allowed (including CBD oil)--follow your care team's instructions.  If you have questions the day of surgery, call your hospital or surgery center.   Preventing infection  Shower or bathe the night before and the morning of surgery. Follow the instructions your clinic gave you. (If no instructions, use regular soap.)  Don't shave or clip hair near your surgery site. We'll remove the hair if needed.  Don't smoke or vape the morning of surgery. No chewing tobacco for 6 hours before you arrive. A nicotine patch is okay. You may spit out nicotine gum when you arrive.  For some surgeries, the surgeon will tell you to fully quit smoking and nicotine.  We will make every effort to keep you safe from infection. We will:  Clean our hands often with soap and water (or an alcohol-based hand rub).  Clean the skin at your surgery site with a special soap that kills germs.  Give you a special gown to keep you warm. (Cold raises the risk of infection.)  Wear hair covers, masks, gowns, and gloves during surgery.  Give antibiotic medicine, if prescribed. Not all surgeries need this medicine.  What to bring on the day of surgery  Photo ID and insurance card  Copy of your health care directive, if you have  one  Glasses and hearing aids (bring cases)  You can't wear contacts during surgery  Inhaler and eye drops, if you use them (tell us about these when you arrive)  CPAP machine or breathing device, if you use them  A few personal items, if spending the night  If you have . . .  A pacemaker, ICD (cardiac defibrillator), or other implant: Bring the ID card.  An implanted stimulator: Bring the remote control.  A legal guardian: Bring a copy of the certified (court-stamped) guardianship papers.  Please remove any jewelry, including body piercings. Leave jewelry and other valuables at home.  If you're going home the day of surgery  You must have a responsible adult drive you home. They should stay with you overnight as well.  If you don't have someone to stay with you, and you aren't safe to go home alone, we may keep you overnight. Insurance often won't pay for this.  After surgery  If it's hard to control your pain or you need more pain medicine, please call your surgeon's office.  Questions?   If you have any questions for your care team, list them here:   ____________________________________________________________________________________________________________________________________________________________________________________________________________________________________________________________  For informational purposes only. Not to replace the advice of your health care provider. Copyright   2003, 2019 [facility]. All rights reserved. Clinically reviewed by Avery Ge MD. [employer] [id number] - REV 08/24.

## 2024-12-06 ENCOUNTER — HOSPITAL ENCOUNTER (OUTPATIENT)
Facility: AMBULATORY SURGERY CENTER | Age: 59
Discharge: HOME | End: 2024-12-06
Attending: SURGERY | Admitting: SURGERY
Payer: COMMERCIAL

## 2024-12-06 VITALS
DIASTOLIC BLOOD PRESSURE: 67 MMHG | RESPIRATION RATE: 20 BRPM | HEART RATE: 76 BPM | SYSTOLIC BLOOD PRESSURE: 108 MMHG | TEMPERATURE: 97.5 F | OXYGEN SATURATION: 99 %

## 2024-12-06 DIAGNOSIS — K42.9 UMBILICAL HERNIA WITHOUT OBSTRUCTION AND WITHOUT GANGRENE: Primary | ICD-10-CM

## 2024-12-06 PROCEDURE — G8907 PT DOC NO EVENTS ON DISCHARG: HCPCS

## 2024-12-06 PROCEDURE — G8916 PT W IV AB GIVEN ON TIME: HCPCS

## 2024-12-06 PROCEDURE — 49591 RPR AA HRN 1ST < 3 CM RDC: CPT | Performed by: SURGERY

## 2024-12-06 PROCEDURE — 49591 RPR AA HRN 1ST < 3 CM RDC: CPT

## 2024-12-06 DEVICE — COMPOSITE VENTRAL PATCH, POLYESTER & POLYGLYCOLIC-LACTIC ACID PATCH WITH ABSORBABLE COLLAGEN FILM
Type: IMPLANTABLE DEVICE | Site: ABDOMEN | Status: FUNCTIONAL
Brand: PARIETEX

## 2024-12-06 RX ORDER — ALBUTEROL SULFATE 0.83 MG/ML
2.5 SOLUTION RESPIRATORY (INHALATION) EVERY 4 HOURS PRN
Status: DISCONTINUED | OUTPATIENT
Start: 2024-12-06 | End: 2024-12-07 | Stop reason: HOSPADM

## 2024-12-06 RX ORDER — DEXAMETHASONE SODIUM PHOSPHATE 4 MG/ML
4 INJECTION, SOLUTION INTRA-ARTICULAR; INTRALESIONAL; INTRAMUSCULAR; INTRAVENOUS; SOFT TISSUE
Status: DISCONTINUED | OUTPATIENT
Start: 2024-12-06 | End: 2024-12-07 | Stop reason: HOSPADM

## 2024-12-06 RX ORDER — SODIUM CHLORIDE, SODIUM LACTATE, POTASSIUM CHLORIDE, CALCIUM CHLORIDE 600; 310; 30; 20 MG/100ML; MG/100ML; MG/100ML; MG/100ML
INJECTION, SOLUTION INTRAVENOUS CONTINUOUS
Status: DISCONTINUED | OUTPATIENT
Start: 2024-12-06 | End: 2024-12-07 | Stop reason: HOSPADM

## 2024-12-06 RX ORDER — OXYCODONE HYDROCHLORIDE 5 MG/1
5 TABLET ORAL
Status: DISCONTINUED | OUTPATIENT
Start: 2024-12-06 | End: 2024-12-07 | Stop reason: HOSPADM

## 2024-12-06 RX ORDER — ACETAMINOPHEN 325 MG/1
975 TABLET ORAL ONCE
Status: DISCONTINUED | OUTPATIENT
Start: 2024-12-06 | End: 2024-12-07 | Stop reason: HOSPADM

## 2024-12-06 RX ORDER — DIPHENHYDRAMINE HCL 25 MG
25 CAPSULE ORAL EVERY 6 HOURS PRN
Status: DISCONTINUED | OUTPATIENT
Start: 2024-12-06 | End: 2024-12-07 | Stop reason: HOSPADM

## 2024-12-06 RX ORDER — NALOXONE HYDROCHLORIDE 0.4 MG/ML
0.1 INJECTION, SOLUTION INTRAMUSCULAR; INTRAVENOUS; SUBCUTANEOUS
Status: DISCONTINUED | OUTPATIENT
Start: 2024-12-06 | End: 2024-12-07 | Stop reason: HOSPADM

## 2024-12-06 RX ORDER — OXYCODONE HYDROCHLORIDE 5 MG/1
10 TABLET ORAL
Status: DISCONTINUED | OUTPATIENT
Start: 2024-12-06 | End: 2024-12-07 | Stop reason: HOSPADM

## 2024-12-06 RX ORDER — LORAZEPAM 2 MG/ML
.5-1 INJECTION INTRAMUSCULAR
Status: DISCONTINUED | OUTPATIENT
Start: 2024-12-06 | End: 2024-12-07 | Stop reason: HOSPADM

## 2024-12-06 RX ORDER — KETOROLAC TROMETHAMINE 30 MG/ML
15 INJECTION, SOLUTION INTRAMUSCULAR; INTRAVENOUS
Status: DISCONTINUED | OUTPATIENT
Start: 2024-12-06 | End: 2024-12-07 | Stop reason: HOSPADM

## 2024-12-06 RX ORDER — FENTANYL CITRATE 50 UG/ML
50 INJECTION, SOLUTION INTRAMUSCULAR; INTRAVENOUS EVERY 5 MIN PRN
Status: DISCONTINUED | OUTPATIENT
Start: 2024-12-06 | End: 2024-12-07 | Stop reason: HOSPADM

## 2024-12-06 RX ORDER — OXYCODONE HYDROCHLORIDE 5 MG/1
5-10 TABLET ORAL EVERY 4 HOURS PRN
Qty: 10 TABLET | Refills: 0 | Status: SHIPPED | OUTPATIENT
Start: 2024-12-06

## 2024-12-06 RX ORDER — ACETAMINOPHEN 325 MG/1
975 TABLET ORAL ONCE
Status: COMPLETED | OUTPATIENT
Start: 2024-12-06 | End: 2024-12-06

## 2024-12-06 RX ORDER — LABETALOL HYDROCHLORIDE 5 MG/ML
10 INJECTION, SOLUTION INTRAVENOUS
Status: DISCONTINUED | OUTPATIENT
Start: 2024-12-06 | End: 2024-12-07 | Stop reason: HOSPADM

## 2024-12-06 RX ORDER — MEPERIDINE HYDROCHLORIDE 25 MG/ML
12.5 INJECTION INTRAMUSCULAR; INTRAVENOUS; SUBCUTANEOUS EVERY 5 MIN PRN
Status: DISCONTINUED | OUTPATIENT
Start: 2024-12-06 | End: 2024-12-07 | Stop reason: HOSPADM

## 2024-12-06 RX ORDER — DIPHENHYDRAMINE HYDROCHLORIDE 50 MG/ML
25 INJECTION INTRAMUSCULAR; INTRAVENOUS EVERY 6 HOURS PRN
Status: DISCONTINUED | OUTPATIENT
Start: 2024-12-06 | End: 2024-12-07 | Stop reason: HOSPADM

## 2024-12-06 RX ORDER — ONDANSETRON 4 MG/1
4 TABLET, ORALLY DISINTEGRATING ORAL EVERY 30 MIN PRN
Status: DISCONTINUED | OUTPATIENT
Start: 2024-12-06 | End: 2024-12-07 | Stop reason: HOSPADM

## 2024-12-06 RX ORDER — HYDRALAZINE HYDROCHLORIDE 20 MG/ML
2.5-5 INJECTION INTRAMUSCULAR; INTRAVENOUS EVERY 10 MIN PRN
Status: DISCONTINUED | OUTPATIENT
Start: 2024-12-06 | End: 2024-12-07 | Stop reason: HOSPADM

## 2024-12-06 RX ORDER — CEFAZOLIN SODIUM 2 G/50ML
2 SOLUTION INTRAVENOUS
Status: COMPLETED | OUTPATIENT
Start: 2024-12-06 | End: 2024-12-06

## 2024-12-06 RX ORDER — FENTANYL CITRATE 50 UG/ML
25 INJECTION, SOLUTION INTRAMUSCULAR; INTRAVENOUS
Status: DISCONTINUED | OUTPATIENT
Start: 2024-12-06 | End: 2024-12-07 | Stop reason: HOSPADM

## 2024-12-06 RX ORDER — FENTANYL CITRATE 50 UG/ML
25 INJECTION, SOLUTION INTRAMUSCULAR; INTRAVENOUS EVERY 5 MIN PRN
Status: DISCONTINUED | OUTPATIENT
Start: 2024-12-06 | End: 2024-12-07 | Stop reason: HOSPADM

## 2024-12-06 RX ORDER — ONDANSETRON 2 MG/ML
4 INJECTION INTRAMUSCULAR; INTRAVENOUS EVERY 30 MIN PRN
Status: DISCONTINUED | OUTPATIENT
Start: 2024-12-06 | End: 2024-12-07 | Stop reason: HOSPADM

## 2024-12-06 RX ORDER — LIDOCAINE 40 MG/G
CREAM TOPICAL
Status: DISCONTINUED | OUTPATIENT
Start: 2024-12-06 | End: 2024-12-07 | Stop reason: HOSPADM

## 2024-12-06 RX ORDER — CEFAZOLIN SODIUM 2 G/50ML
2 SOLUTION INTRAVENOUS SEE ADMIN INSTRUCTIONS
Status: DISCONTINUED | OUTPATIENT
Start: 2024-12-06 | End: 2024-12-07 | Stop reason: HOSPADM

## 2024-12-06 RX ORDER — BUPIVACAINE HYDROCHLORIDE AND EPINEPHRINE 5; 5 MG/ML; UG/ML
INJECTION, SOLUTION EPIDURAL; INTRACAUDAL; PERINEURAL PRN
Status: DISCONTINUED | OUTPATIENT
Start: 2024-12-06 | End: 2024-12-06 | Stop reason: HOSPADM

## 2024-12-06 RX ADMIN — ACETAMINOPHEN 975 MG: 325 TABLET ORAL at 12:29

## 2024-12-06 NOTE — OP NOTE
Date of Service: 12/6/2024     STAFF SURGEON:  David Steel MD     ASSISTANT:  None.     PREOPERATIVE DIAGNOSIS:  umbilical hernia     POSTOPERATIVE DIAGNOSIS:  Same     NAME OF PROCEDURE(S): Open umbilical hernia repair with mesh     INDICATIONS FOR PROCEDURE:  The patient is a 59-year-old male who developed small umbilical hernia.  I offered an open approach to repair for him.  I discussed possibility of mesh reinforcement depending on actual hernia defect size.  Risks, benefits, terms discussed and consent obtained.     EBL: 5 cc    ANESTHESIA: General    COMPLICATIONS: None     DRAINS:  None.     SPECIMENS: None     IMPLANTS: Covidien parietex mesh 6.6 cm     OPERATIVE FINDINGS: Umbilical hernia with peritoneal fatty tissue, reducible.  Defect 1.6 cm.  Mesh placed in preperitoneal pocket.     PROCEDURE DETAIL:  Following consent, the patient was brought from the preoperative holding area to the operating suite and laid in supine position. Abdomen prepped and draped in usual fashion. Time out performed and antibiotics given.              I began with a 4 cm curved infraumbilical incision with #15 blade. I dissected through subcutaneous tissues with cautery until I reached the level of the fascia. Hernia and contents encircled and the umbilical skin dissected from hernia. This revealed the herniated  tissue. This appeared as some peritoneal fat. This herniated fat was freed from the fascia and reduced.  I then used blunt finger sweeps in the preperitoneal space to create a pocket large enough for mesh placement.  Umbilical defect was about 1.6 cm so I selected the 6.6 size parietex mesh. This was inserted and elevated to the abdominal wall. I used interrupted 0 ethibond sutures to close the defect and incorporated the mesh tabs to hold in position. Umbilical skin tacked back down to fascia with 3-0 vicryl. Subcutaneous tissues closed with running 3-0 vicryl. Skin closed with running subcuticular 4-0 monocryl.  Incision covered with dermabond. Wound further dressed with gauze and tegaderm at the umbilicus. Final counts complete. Patient tolerated well and discharged to PACU in stable condition.           David Steel MD

## 2024-12-18 ENCOUNTER — OFFICE VISIT (OUTPATIENT)
Dept: SURGERY | Facility: CLINIC | Age: 59
End: 2024-12-18
Payer: COMMERCIAL

## 2024-12-18 VITALS — HEART RATE: 96 BPM | SYSTOLIC BLOOD PRESSURE: 151 MMHG | DIASTOLIC BLOOD PRESSURE: 83 MMHG

## 2024-12-18 DIAGNOSIS — Z09 S/P UMBILICAL HERNIA REPAIR, FOLLOW-UP EXAM: Primary | ICD-10-CM

## 2024-12-18 NOTE — LETTER
12/18/2024      Сергей Navarro  [address]  [place] [address]      Dear Colleague,    Thank you for referring your patient, Сергей Navarro, to the [facility]. Please see a copy of my visit note below.    General Surgery Post Op    Pt returns for follow up visit s/p open umbilical hernia repair with mesh on 12/6/2024.    Patient has been doing well, tolerating diet. Bowels moving well. Pain controlled. No issues with wound healing/redness/drainage. No fevers.      Physical exam: Vitals: BP (!) 151/83   Pulse 96   BMI= There is no height or weight on file to calculate BMI.    Exam:  Constitutional: healthy, alert, and no distress  Gastrointestinal: Abdomen soft, non-tender. BS normal. No masses, organomegaly  Infraumbilical incision healing well no signs of infection.  Intact with glue.        Assessment:     ICD-10-CM    1. S/P umbilical hernia repair, follow-up exam  Z09         Plan: Recovering well from his umbilical hernia repair.  Okay to start increasing activities back to normal.  Follow-up in clinic as needed.    David Steel MD      Again, thank you for allowing me to participate in the care of your patient.        Sincerely,        David Steel MD

## 2024-12-18 NOTE — PROGRESS NOTES
General Surgery Post Op    Pt returns for follow up visit s/p open umbilical hernia repair with mesh on 12/6/2024.    Patient has been doing well, tolerating diet. Bowels moving well. Pain controlled. No issues with wound healing/redness/drainage. No fevers.      Physical exam: Vitals: BP (!) 151/83   Pulse 96   BMI= There is no height or weight on file to calculate BMI.    Exam:  Constitutional: healthy, alert, and no distress  Gastrointestinal: Abdomen soft, non-tender. BS normal. No masses, organomegaly  Infraumbilical incision healing well no signs of infection.  Intact with glue.        Assessment:     ICD-10-CM    1. S/P umbilical hernia repair, follow-up exam  Z09         Plan: Recovering well from his umbilical hernia repair.  Okay to start increasing activities back to normal.  Follow-up in clinic as needed.    David Steel MD

## 2025-03-03 ENCOUNTER — PATIENT OUTREACH (OUTPATIENT)
Dept: CARE COORDINATION | Facility: CLINIC | Age: 60
End: 2025-03-03
Payer: COMMERCIAL

## 2025-03-17 ENCOUNTER — PATIENT OUTREACH (OUTPATIENT)
Dept: CARE COORDINATION | Facility: CLINIC | Age: 60
End: 2025-03-17
Payer: COMMERCIAL

## 2025-05-10 ENCOUNTER — HEALTH MAINTENANCE LETTER (OUTPATIENT)
Age: 60
End: 2025-05-10

## (undated) DEVICE — SOL NACL 0.9% IRRIG 1000ML BOTTLE 07138-09

## (undated) DEVICE — SU ETHIBOND 0 CT-2 30" X412H

## (undated) DEVICE — GLOVE BIOGEL PI ULTRATOUCH G SZ 7.5 42175

## (undated) DEVICE — PREP CHLORAPREP 26ML TINTED ORANGE  260815

## (undated) DEVICE — SOL WATER IRRIG 1000ML BOTTLE 07139-09

## (undated) DEVICE — GLOVE BIOGEL PI MICRO INDICATOR UNDERGLOVE SZ 7.5 48975

## (undated) DEVICE — DRSG TEGADERM 4X4 3/4" 1626W

## (undated) DEVICE — SU MONOCRYL 4-0 PS-2 18" UND Y496G

## (undated) DEVICE — SU DERMABOND DHV12

## (undated) DEVICE — DRAPE IOBAN INCISE 23X17" 6650EZ

## (undated) DEVICE — SU VICRYL 3-0 SH 27" UND J416H

## (undated) DEVICE — SUCTION TIP YANKAUER W/O VENT K86

## (undated) DEVICE — PACK MINOR SBA15MIFSE

## (undated) DEVICE — DRAPE LAP W/ARMBOARD 29410

## (undated) DEVICE — ESU PENCIL SMOKE EVAC W/ROCKER SWITCH 0703-047-000

## (undated) RX ORDER — ACETAMINOPHEN 325 MG/1
TABLET ORAL
Status: DISPENSED
Start: 2024-12-06

## (undated) RX ORDER — PROPOFOL 10 MG/ML
INJECTION, EMULSION INTRAVENOUS
Status: DISPENSED
Start: 2024-12-06

## (undated) RX ORDER — FENTANYL CITRATE-0.9 % NACL/PF 10 MCG/ML
PLASTIC BAG, INJECTION (ML) INTRAVENOUS
Status: DISPENSED
Start: 2024-12-06

## (undated) RX ORDER — CEFAZOLIN SODIUM 2 G/50ML
SOLUTION INTRAVENOUS
Status: DISPENSED
Start: 2024-12-06

## (undated) RX ORDER — DEXAMETHASONE SODIUM PHOSPHATE 4 MG/ML
INJECTION, SOLUTION INTRA-ARTICULAR; INTRALESIONAL; INTRAMUSCULAR; INTRAVENOUS; SOFT TISSUE
Status: DISPENSED
Start: 2024-12-06

## (undated) RX ORDER — ONDANSETRON 2 MG/ML
INJECTION INTRAMUSCULAR; INTRAVENOUS
Status: DISPENSED
Start: 2024-12-06

## (undated) RX ORDER — FENTANYL CITRATE 50 UG/ML
INJECTION, SOLUTION INTRAMUSCULAR; INTRAVENOUS
Status: DISPENSED
Start: 2024-12-06

## (undated) RX ORDER — FENTANYL CITRATE 50 UG/ML
INJECTION, SOLUTION INTRAMUSCULAR; INTRAVENOUS
Status: DISPENSED
Start: 2024-06-25